# Patient Record
Sex: FEMALE | URBAN - METROPOLITAN AREA
[De-identification: names, ages, dates, MRNs, and addresses within clinical notes are randomized per-mention and may not be internally consistent; named-entity substitution may affect disease eponyms.]

---

## 2023-05-18 PROBLEM — 49436004: Status: ACTIVE | Noted: 2023-05-18

## 2023-05-18 PROBLEM — 711150003: Status: ACTIVE | Noted: 2023-05-18

## 2023-05-19 ENCOUNTER — OFFICE VISIT (OUTPATIENT)
Dept: URBAN - METROPOLITAN AREA CLINIC 113 | Facility: CLINIC | Age: 78
End: 2023-05-19
Payer: MEDICARE

## 2023-05-19 VITALS
HEART RATE: 60 BPM | DIASTOLIC BLOOD PRESSURE: 76 MMHG | SYSTOLIC BLOOD PRESSURE: 118 MMHG | RESPIRATION RATE: 14 BRPM | BODY MASS INDEX: 43.04 KG/M2 | TEMPERATURE: 97.6 F | WEIGHT: 284 LBS | HEIGHT: 68 IN

## 2023-05-19 DIAGNOSIS — Z79.01 CHRONIC ANTICOAGULATION: ICD-10-CM

## 2023-05-19 DIAGNOSIS — R19.5 POSITIVE COLORECTAL CANCER SCREENING USING COLOGUARD TEST: ICD-10-CM

## 2023-05-19 DIAGNOSIS — I48.91 ATRIAL FIBRILLATION, UNSPECIFIED TYPE: ICD-10-CM

## 2023-05-19 PROCEDURE — 99204 OFFICE O/P NEW MOD 45 MIN: CPT | Performed by: INTERNAL MEDICINE

## 2023-05-19 RX ORDER — METFORMIN HYDROCHLORIDE 750 MG/1
1 TABLET WITH EVENING MEAL TABLET, EXTENDED RELEASE ORAL ONCE A DAY
Status: ACTIVE | COMMUNITY

## 2023-05-19 RX ORDER — ATORVASTATIN CALCIUM 80 MG/1
1 TABLET TABLET, FILM COATED ORAL ONCE A DAY
Status: ACTIVE | COMMUNITY

## 2023-05-19 RX ORDER — MONTELUKAST 10 MG/1
1 TABLET TABLET, FILM COATED ORAL ONCE A DAY
Status: ACTIVE | COMMUNITY

## 2023-05-19 RX ORDER — IPRATROPIUM BROMIDE 42 UG/1
2 SPRAYS IN EACH NOSTRIL SPRAY NASAL THREE TIMES A DAY
Status: ACTIVE | COMMUNITY

## 2023-05-19 RX ORDER — APIXABAN 5 MG/1
1 TABLET TABLET, FILM COATED ORAL TWICE A DAY
Status: ACTIVE | COMMUNITY

## 2023-05-19 RX ORDER — LOSARTAN POTASSIUM 100 MG/1
1 TABLET TABLET ORAL ONCE A DAY
Status: ACTIVE | COMMUNITY

## 2023-05-19 RX ORDER — DULOXETINE 60 MG/1
1 CAPSULE CAPSULE, DELAYED RELEASE PELLETS ORAL ONCE A DAY
Status: ACTIVE | COMMUNITY

## 2023-05-19 RX ORDER — LEVOTHYROXINE SODIUM 88 UG/1
1 TABLET IN THE MORNING ON AN EMPTY STOMACH TABLET ORAL ONCE A DAY
Status: ACTIVE | COMMUNITY

## 2023-05-19 NOTE — HPI-TODAY'S VISIT:
78-year-old referred for positive Cologuard test by Dr. Ned Trujillo.  A copy of his report will be sent to his office.  The patient does have a history of atrial fibrillation status post ablation on chronic anticoagulation with Eliquis, diabetes mellitus, coronary artery disease status post coronary artery bypass graft, hypertension, hypothyroidism, sleep apnea, obesity, status post pacemaker placement.  She has heartburn symptoms about twice a week without any dysphagia or abdominal pain.  There is no nausea or vomiting.  She has a bowel movement every day to every other day and has been no blood or melena.  She occasionally will get some chest pains both with exertion and without exertion.  She does have some shortness of breath and dizziness at times.  The last time she saw her cardiologist was 2 months ago. Her cardiologist is Dr. Vitro Dale.  Blood work on 1/16/2023 revealed hemoglobin 12.9, WBC of 4.9 platelet count of 189,000.  Sodium 139 potassium 4.2 BUN 24 creatinine 0.71 AST 30, ALT 20, alk phosphatase 99, total bili 0.6, TSH is 1.93.

## 2023-07-21 ENCOUNTER — OFFICE VISIT (OUTPATIENT)
Dept: URBAN - METROPOLITAN AREA CLINIC 113 | Facility: CLINIC | Age: 78
End: 2023-07-21

## 2024-05-01 ENCOUNTER — OFFICE VISIT (OUTPATIENT)
Dept: URBAN - METROPOLITAN AREA CLINIC 113 | Facility: CLINIC | Age: 79
End: 2024-05-01
Payer: MEDICARE

## 2024-05-01 ENCOUNTER — DASHBOARD ENCOUNTERS (OUTPATIENT)
Age: 79
End: 2024-05-01

## 2024-05-01 ENCOUNTER — LAB OUTSIDE AN ENCOUNTER (OUTPATIENT)
Dept: URBAN - METROPOLITAN AREA CLINIC 113 | Facility: CLINIC | Age: 79
End: 2024-05-01

## 2024-05-01 VITALS
HEIGHT: 68 IN | SYSTOLIC BLOOD PRESSURE: 136 MMHG | HEART RATE: 64 BPM | DIASTOLIC BLOOD PRESSURE: 91 MMHG | BODY MASS INDEX: 43.59 KG/M2 | WEIGHT: 287.6 LBS | TEMPERATURE: 97.3 F

## 2024-05-01 DIAGNOSIS — Z95.818 PRESENCE OF WATCHMAN LEFT ATRIAL APPENDAGE CLOSURE DEVICE: ICD-10-CM

## 2024-05-01 DIAGNOSIS — Z12.11 COLON CANCER SCREENING: ICD-10-CM

## 2024-05-01 DIAGNOSIS — R19.5 POSITIVE COLORECTAL CANCER SCREENING USING COLOGUARD TEST: ICD-10-CM

## 2024-05-01 DIAGNOSIS — Z86.79 HISTORY OF ATRIAL FIBRILLATION: ICD-10-CM

## 2024-05-01 PROBLEM — 441509002: Status: ACTIVE | Noted: 2024-05-01

## 2024-05-01 PROBLEM — 1259226009: Status: ACTIVE | Noted: 2024-05-01

## 2024-05-01 PROCEDURE — 99243 OFF/OP CNSLTJ NEW/EST LOW 30: CPT | Performed by: NURSE PRACTITIONER

## 2024-05-01 PROCEDURE — 99203 OFFICE O/P NEW LOW 30 MIN: CPT | Performed by: NURSE PRACTITIONER

## 2024-05-01 RX ORDER — IPRATROPIUM BROMIDE 42 UG/1
2 SPRAYS IN EACH NOSTRIL SPRAY NASAL THREE TIMES A DAY
Status: ACTIVE | COMMUNITY

## 2024-05-01 RX ORDER — LEVOTHYROXINE SODIUM 88 UG/1
1 TABLET IN THE MORNING ON AN EMPTY STOMACH TABLET ORAL ONCE A DAY
Status: ACTIVE | COMMUNITY

## 2024-05-01 RX ORDER — ASPIRIN 81 MG/1
1 TABLET TABLET, COATED ORAL ONCE A DAY
Status: ACTIVE | COMMUNITY

## 2024-05-01 RX ORDER — ATORVASTATIN CALCIUM 80 MG/1
1 TABLET TABLET, FILM COATED ORAL ONCE A DAY
Status: ACTIVE | COMMUNITY

## 2024-05-01 RX ORDER — MONTELUKAST 10 MG/1
1 TABLET TABLET, FILM COATED ORAL ONCE A DAY
Status: ACTIVE | COMMUNITY

## 2024-05-01 RX ORDER — APIXABAN 5 MG/1
1 TABLET TABLET, FILM COATED ORAL TWICE A DAY
Status: ON HOLD | COMMUNITY

## 2024-05-01 RX ORDER — METFORMIN HYDROCHLORIDE 750 MG/1
1 TABLET WITH EVENING MEAL TABLET, EXTENDED RELEASE ORAL ONCE A DAY
Status: ACTIVE | COMMUNITY

## 2024-05-01 RX ORDER — LOSARTAN POTASSIUM 100 MG/1
1 TABLET TABLET ORAL ONCE A DAY
Status: ACTIVE | COMMUNITY

## 2024-05-01 RX ORDER — DULOXETINE 60 MG/1
1 CAPSULE CAPSULE, DELAYED RELEASE PELLETS ORAL ONCE A DAY
Status: ACTIVE | COMMUNITY

## 2024-07-03 ENCOUNTER — LAB OUTSIDE AN ENCOUNTER (OUTPATIENT)
Dept: URBAN - METROPOLITAN AREA CLINIC 113 | Facility: CLINIC | Age: 79
End: 2024-07-03

## 2024-07-03 ENCOUNTER — TELEPHONE ENCOUNTER (OUTPATIENT)
Dept: URBAN - METROPOLITAN AREA CLINIC 113 | Facility: CLINIC | Age: 79
End: 2024-07-03

## 2024-07-03 RX ORDER — SODIUM, POTASSIUM,MAG SULFATES 17.5-3.13G
AS DIRECTED SOLUTION, RECONSTITUTED, ORAL ORAL ONCE
Qty: 354 ML | Refills: 0 | OUTPATIENT
Start: 2024-07-03 | End: 2024-07-04

## 2024-07-31 ENCOUNTER — OFFICE VISIT (OUTPATIENT)
Dept: URBAN - METROPOLITAN AREA MEDICAL CENTER 19 | Facility: MEDICAL CENTER | Age: 79
End: 2024-07-31

## 2024-08-29 ENCOUNTER — OFFICE VISIT (OUTPATIENT)
Dept: URBAN - METROPOLITAN AREA CLINIC 113 | Facility: CLINIC | Age: 79
End: 2024-08-29
Payer: MEDICARE

## 2024-08-29 VITALS
BODY MASS INDEX: 43.29 KG/M2 | HEIGHT: 68 IN | TEMPERATURE: 97 F | WEIGHT: 285.6 LBS | RESPIRATION RATE: 16 BRPM | HEART RATE: 83 BPM | DIASTOLIC BLOOD PRESSURE: 74 MMHG | SYSTOLIC BLOOD PRESSURE: 111 MMHG

## 2024-08-29 DIAGNOSIS — D12.6 SERRATED ADENOMA OF COLON: ICD-10-CM

## 2024-08-29 PROCEDURE — 99213 OFFICE O/P EST LOW 20 MIN: CPT | Performed by: NURSE PRACTITIONER

## 2024-08-29 RX ORDER — METFORMIN HYDROCHLORIDE 750 MG/1
1 TABLET WITH EVENING MEAL TABLET, EXTENDED RELEASE ORAL ONCE A DAY
Status: ACTIVE | COMMUNITY

## 2024-08-29 RX ORDER — APIXABAN 5 MG/1
1 TABLET TABLET, FILM COATED ORAL TWICE A DAY
Status: ON HOLD | COMMUNITY

## 2024-08-29 RX ORDER — LOSARTAN POTASSIUM 100 MG/1
1 TABLET TABLET ORAL ONCE A DAY
Status: ACTIVE | COMMUNITY

## 2024-08-29 RX ORDER — MONTELUKAST 10 MG/1
1 TABLET TABLET, FILM COATED ORAL ONCE A DAY
Status: ACTIVE | COMMUNITY

## 2024-08-29 RX ORDER — ASPIRIN 81 MG/1
1 TABLET TABLET, COATED ORAL ONCE A DAY
Status: ACTIVE | COMMUNITY

## 2024-08-29 RX ORDER — DULOXETINE 60 MG/1
1 CAPSULE CAPSULE, DELAYED RELEASE PELLETS ORAL ONCE A DAY
Status: ACTIVE | COMMUNITY

## 2024-08-29 RX ORDER — LEVOTHYROXINE SODIUM 88 UG/1
1 TABLET IN THE MORNING ON AN EMPTY STOMACH TABLET ORAL ONCE A DAY
Status: ACTIVE | COMMUNITY

## 2024-08-29 RX ORDER — IPRATROPIUM BROMIDE 42 UG/1
2 SPRAYS IN EACH NOSTRIL SPRAY NASAL THREE TIMES A DAY
Status: ACTIVE | COMMUNITY

## 2024-08-29 RX ORDER — ATORVASTATIN CALCIUM 80 MG/1
1 TABLET TABLET, FILM COATED ORAL ONCE A DAY
Status: ACTIVE | COMMUNITY

## 2024-08-29 NOTE — HPI-TODAY'S VISIT:
80 yo woman presenitng for follow up after a colonoscopy.   Colonoscopy 7/31/24 was notable for a fair bowel preparation, redundant colon, diverticulosis in the entire colon, nonbleeding internal hemorrhoids, removal of a 15 mm polyp from the transverse colon, and three 5 to 7 mm polyps from the transverse colon. Pathology revealed serrated adenoma. A repeat colonoscopy is recommended in 3 years, using additional bowel preparation.  She is doing well from a symptom standpoint. No dysphagia, heartburn or abdominal pain. She has bowel movements every three days, requiring she use an every day Women's laxative.

## 2025-04-21 PROBLEM — 442684004: Status: ACTIVE | Noted: 2025-04-21

## 2025-04-21 PROBLEM — 365767001: Status: ACTIVE | Noted: 2025-04-21

## 2025-04-21 PROBLEM — 428054006: Status: ACTIVE | Noted: 2025-04-21

## 2025-04-21 PROBLEM — 398311004: Status: ACTIVE | Noted: 2025-04-21

## 2025-04-22 ENCOUNTER — OFFICE VISIT (OUTPATIENT)
Dept: URBAN - METROPOLITAN AREA CLINIC 113 | Facility: CLINIC | Age: 80
End: 2025-04-22
Payer: MEDICARE

## 2025-04-22 ENCOUNTER — LAB OUTSIDE AN ENCOUNTER (OUTPATIENT)
Dept: URBAN - METROPOLITAN AREA CLINIC 113 | Facility: CLINIC | Age: 80
End: 2025-04-22

## 2025-04-22 DIAGNOSIS — K80.20 ASYMPTOMATIC CHOLELITHIASIS: ICD-10-CM

## 2025-04-22 DIAGNOSIS — R93.2 ABNORMAL FINDING ON IMAGING OF LIVER: ICD-10-CM

## 2025-04-22 DIAGNOSIS — K57.30 DIVERTICULOSIS OF COLON WITHOUT DIVERTICULITIS: ICD-10-CM

## 2025-04-22 DIAGNOSIS — Z86.0101 H/O ADENOMATOUS POLYP OF COLON: ICD-10-CM

## 2025-04-22 DIAGNOSIS — R74.01 ELEVATED TRANSAMINASE LEVEL: ICD-10-CM

## 2025-04-22 PROBLEM — 266474003: Status: ACTIVE | Noted: 2025-04-22

## 2025-04-22 PROCEDURE — 99214 OFFICE O/P EST MOD 30 MIN: CPT | Performed by: INTERNAL MEDICINE

## 2025-04-22 RX ORDER — ATORVASTATIN CALCIUM 80 MG/1
1 TABLET TABLET, FILM COATED ORAL ONCE A DAY
Status: ACTIVE | COMMUNITY

## 2025-04-22 RX ORDER — ASPIRIN 81 MG/1
1 TABLET TABLET, COATED ORAL ONCE A DAY
Status: ACTIVE | COMMUNITY

## 2025-04-22 RX ORDER — SOTALOL HYDROCHLORIDE 80 MG/1
1 TABLET TABLET ORAL
Status: ACTIVE | COMMUNITY

## 2025-04-22 RX ORDER — DULOXETINE 60 MG/1
1 CAPSULE CAPSULE, DELAYED RELEASE PELLETS ORAL ONCE A DAY
Status: ACTIVE | COMMUNITY

## 2025-04-22 RX ORDER — EZETIMIBE 10 MG/1
1 TABLET TABLET ORAL ONCE A DAY
Status: ACTIVE | COMMUNITY

## 2025-04-22 RX ORDER — METFORMIN HYDROCHLORIDE 750 MG/1
1 TABLET WITH EVENING MEAL TABLET, EXTENDED RELEASE ORAL ONCE A DAY
Status: ACTIVE | COMMUNITY

## 2025-04-22 RX ORDER — MONTELUKAST 10 MG/1
1 TABLET TABLET, FILM COATED ORAL ONCE A DAY
Status: ACTIVE | COMMUNITY

## 2025-04-22 RX ORDER — LOSARTAN POTASSIUM 100 MG/1
1 TABLET TABLET ORAL ONCE A DAY
Status: ACTIVE | COMMUNITY

## 2025-04-22 RX ORDER — IPRATROPIUM BROMIDE 42 UG/1
2 SPRAYS IN EACH NOSTRIL SPRAY NASAL THREE TIMES A DAY
Status: ACTIVE | COMMUNITY

## 2025-04-22 RX ORDER — LEVOTHYROXINE SODIUM 88 UG/1
1 TABLET IN THE MORNING ON AN EMPTY STOMACH TABLET ORAL ONCE A DAY
Status: ACTIVE | COMMUNITY

## 2025-04-22 RX ORDER — APIXABAN 5 MG/1
1 TABLET TABLET, FILM COATED ORAL TWICE A DAY
Status: ON HOLD | COMMUNITY

## 2025-04-22 NOTE — HPI-TODAY'S VISIT:
81 yo woman with a history of diabetes, hypothyroidism, HLD, s/p permanent pacemaker, history of atrial fibrillation s/p ablation on chronic antiplatelet on Clopidogrel status post Watchman on December 2023, initially referred by Dr. Ned Trujillo for positive Cologuard, now referred for elevation in liver enzymes and abnormal ultrasound examination.  There is no family history of liver cirrhosis, liver disease or liver cancer.  She denies any heartburn, abdominal pain, dysphagia.  There is been no nausea or vomiting.  She does move her bowels every day to every other day there is been no blood or melena.  She does not drink alcohol.  She is no longer on any blood thinning medication. She tells me that her pacemaker is permanent and that she is paced 100% of time. She follows with Dr. Carmona at [facility] as well as Dr. Vitor Rowe. She has sleep apnea and uses a BiPap at nighttime, which she has been for years. No supplemental oxygen needs.  She has a history of bariatric surgery in 2005, and has struggled with iron deficiency since this time. She is taking a bariatric vitamin with iron.  Blood work on 2/26/2025 revealed a hemoglobin of 14.5, WBC of 5.6 and platelet count of 139,000.  MCV is 95, iron saturation is 46% with a ferritin of 22.  Sodium 139 potassium 4.6 BUN 25 creatinine 0.89.  AST 45, ALT 58, alkaline phosphatase 104, total bili 0.6, albumin 3.5, TSH is 1.39, A1c is 6.2.

## 2025-04-22 NOTE — HPI-OTHER HISTORIES
Abdominal ultrasound on 4/3/2025 revealed a normal-sized liver there is heterogeneous echotexture with some lobulation of the liver surface concerning for possible cirrhosis.  The common bile duct is of normal size.  The gallbladder wall is normal there is cholelithiasis less than 6 mm in size.  There is a stable 4 cm right renal cyst.  There is no ascites.  Colonoscopy 7/31/24 was notable for a fair bowel preparation, redundant colon, diverticulosis in the entire colon, nonbleeding internal hemorrhoids, removal of a 15 mm polyp from the transverse colon, and three 5 to 7 mm polyps from the transverse colon. Pathology revealed serrated adenoma. A repeat colonoscopy is recommended in 3 years, using additional bowel preparation.

## 2025-04-27 LAB
A/G RATIO: 1.4
ABSOLUTE BASOPHILS: 40
ABSOLUTE EOSINOPHILS: 328
ABSOLUTE LYMPHOCYTES: 2023
ABSOLUTE MONOCYTES: 543
ABSOLUTE NEUTROPHILS: 3765
ACTIN (SMOOTH MUSCLE) ANTIBODY (IGG): <20
ALBUMIN: 3.8
ALKALINE PHOSPHATASE: 99
ALPHA-1-ANTITRYPSIN QN: 154
ALT (SGPT): 83
ANA SCREEN, IFA: NEGATIVE
AST (SGOT): 69
BASOPHILS: 0.6
BILIRUBIN, TOTAL: 0.5
BUN/CREATININE RATIO: 33
BUN: 28
CALCIUM: 9.5
CARBON DIOXIDE, TOTAL: 27
CHLORIDE: 105
CREATININE: 0.84
DONOR, HEPATITIS C ANTIBODY (ANTI-HCV): NONREACTIVE
EGFR: 71
ENHANCED LIVER FIBROSIS (ELF) SCORE: 11.25
EOSINOPHILS: 4.9
GLOBULIN, TOTAL: 2.8
GLUCOSE: 97
HEMATOCRIT: 45.9
HEMOGLOBIN: 14.7
HEPATITIS A AB, TOTAL: (no result)
HEPATITIS B CORE AB TOTAL: REACTIVE
HEPATITIS B SURFACE AB IMMUNITY, QN: 303
HEPATITIS B SURFACE ANTIGEN: (no result)
IMMUNOGLOBULIN A: 335
IMMUNOGLOBULIN G: 1279
IMMUNOGLOBULIN M: 121
INR: 1.1
LKM-1 ANTIBODY (IGG): <=20
LYMPHOCYTES: 30.2
MCH: 31.7
MCHC: 32
MCV: 98.9
MITOCHONDRIAL (M2) ANTIBODY: <=20
MONOCYTES: 8.1
MPV: 11.5
NEUTROPHILS: 56.2
PLATELET COUNT: 142
POTASSIUM: 4.3
PROTEIN, TOTAL: 6.6
PT: 11.5
RDW: 12.2
RED BLOOD CELL COUNT: 4.64
SODIUM: 142
WHITE BLOOD CELL COUNT: 6.7

## 2025-05-02 ENCOUNTER — OFFICE VISIT (OUTPATIENT)
Dept: URBAN - METROPOLITAN AREA CLINIC 112 | Facility: CLINIC | Age: 80
End: 2025-05-02
Payer: MEDICARE

## 2025-05-02 DIAGNOSIS — K76.0 FATTY (CHANGE OF) LIVER: ICD-10-CM

## 2025-05-02 PROCEDURE — 76981 USE PARENCHYMA: CPT | Performed by: INTERNAL MEDICINE

## 2025-05-02 RX ORDER — LEVOTHYROXINE SODIUM 88 UG/1
1 TABLET IN THE MORNING ON AN EMPTY STOMACH TABLET ORAL ONCE A DAY
Status: ACTIVE | COMMUNITY

## 2025-05-02 RX ORDER — ATORVASTATIN CALCIUM 80 MG/1
1 TABLET TABLET, FILM COATED ORAL ONCE A DAY
Status: ACTIVE | COMMUNITY

## 2025-05-02 RX ORDER — EZETIMIBE 10 MG/1
1 TABLET TABLET ORAL ONCE A DAY
Status: ACTIVE | COMMUNITY

## 2025-05-02 RX ORDER — MONTELUKAST 10 MG/1
1 TABLET TABLET, FILM COATED ORAL ONCE A DAY
Status: ACTIVE | COMMUNITY

## 2025-05-02 RX ORDER — METFORMIN HYDROCHLORIDE 750 MG/1
1 TABLET WITH EVENING MEAL TABLET, EXTENDED RELEASE ORAL ONCE A DAY
Status: ACTIVE | COMMUNITY

## 2025-05-02 RX ORDER — LOSARTAN POTASSIUM 100 MG/1
1 TABLET TABLET ORAL ONCE A DAY
Status: ACTIVE | COMMUNITY

## 2025-05-02 RX ORDER — DULOXETINE 60 MG/1
1 CAPSULE CAPSULE, DELAYED RELEASE PELLETS ORAL ONCE A DAY
Status: ACTIVE | COMMUNITY

## 2025-05-02 RX ORDER — IPRATROPIUM BROMIDE 42 UG/1
2 SPRAYS IN EACH NOSTRIL SPRAY NASAL THREE TIMES A DAY
Status: ACTIVE | COMMUNITY

## 2025-05-02 RX ORDER — SOTALOL HYDROCHLORIDE 80 MG/1
1 TABLET TABLET ORAL
Status: ACTIVE | COMMUNITY

## 2025-05-02 RX ORDER — APIXABAN 5 MG/1
1 TABLET TABLET, FILM COATED ORAL TWICE A DAY
Status: ON HOLD | COMMUNITY

## 2025-05-02 RX ORDER — ASPIRIN 81 MG/1
1 TABLET TABLET, COATED ORAL ONCE A DAY
Status: ACTIVE | COMMUNITY

## 2025-05-03 ENCOUNTER — CLAIMS CREATED FROM THE CLAIM WINDOW (OUTPATIENT)
Dept: URBAN - METROPOLITAN AREA CLINIC 117 | Facility: CLINIC | Age: 80
End: 2025-05-03
Payer: MEDICARE

## 2025-05-03 DIAGNOSIS — K76.0 FATTY (CHANGE OF) LIVER: ICD-10-CM

## 2025-05-03 PROCEDURE — 76981 USE PARENCHYMA: CPT | Performed by: INTERNAL MEDICINE

## 2025-06-25 ENCOUNTER — OFFICE VISIT (OUTPATIENT)
Dept: URBAN - METROPOLITAN AREA CLINIC 113 | Facility: CLINIC | Age: 80
End: 2025-06-25
Payer: MEDICARE

## 2025-06-25 ENCOUNTER — LAB OUTSIDE AN ENCOUNTER (OUTPATIENT)
Dept: URBAN - METROPOLITAN AREA CLINIC 113 | Facility: CLINIC | Age: 80
End: 2025-06-25

## 2025-06-25 DIAGNOSIS — D12.6 SERRATED ADENOMA OF COLON: ICD-10-CM

## 2025-06-25 DIAGNOSIS — K80.20 ASYMPTOMATIC CHOLELITHIASIS: ICD-10-CM

## 2025-06-25 DIAGNOSIS — R74.01 ELEVATED TRANSAMINASE LEVEL: ICD-10-CM

## 2025-06-25 DIAGNOSIS — K57.30 DIVERTICULOSIS OF COLON WITHOUT DIVERTICULITIS: ICD-10-CM

## 2025-06-25 DIAGNOSIS — K74.02 ADVANCED HEPATIC FIBROSIS: ICD-10-CM

## 2025-06-25 DIAGNOSIS — R93.2 ABNORMAL FINDING ON IMAGING OF LIVER: ICD-10-CM

## 2025-06-25 PROBLEM — 62484002: Status: ACTIVE | Noted: 2025-06-25

## 2025-06-25 PROCEDURE — 99214 OFFICE O/P EST MOD 30 MIN: CPT | Performed by: INTERNAL MEDICINE

## 2025-06-25 RX ORDER — APIXABAN 5 MG/1
1 TABLET TABLET, FILM COATED ORAL TWICE A DAY
Status: ON HOLD | COMMUNITY

## 2025-06-25 RX ORDER — DULOXETINE 60 MG/1
1 CAPSULE CAPSULE, DELAYED RELEASE PELLETS ORAL ONCE A DAY
Status: ACTIVE | COMMUNITY

## 2025-06-25 RX ORDER — IPRATROPIUM BROMIDE 42 UG/1
2 SPRAYS IN EACH NOSTRIL SPRAY NASAL THREE TIMES A DAY
Status: ACTIVE | COMMUNITY

## 2025-06-25 RX ORDER — ATORVASTATIN CALCIUM 80 MG/1
1 TABLET TABLET, FILM COATED ORAL ONCE A DAY
Status: ACTIVE | COMMUNITY

## 2025-06-25 RX ORDER — MONTELUKAST 10 MG/1
1 TABLET TABLET, FILM COATED ORAL ONCE A DAY
Status: ACTIVE | COMMUNITY

## 2025-06-25 RX ORDER — EZETIMIBE 10 MG/1
1 TABLET TABLET ORAL ONCE A DAY
Status: ACTIVE | COMMUNITY

## 2025-06-25 RX ORDER — LOSARTAN POTASSIUM 100 MG/1
1 TABLET TABLET ORAL ONCE A DAY
Status: ACTIVE | COMMUNITY

## 2025-06-25 RX ORDER — ASPIRIN 81 MG/1
1 TABLET TABLET, COATED ORAL ONCE A DAY
Status: ACTIVE | COMMUNITY

## 2025-06-25 RX ORDER — METFORMIN HYDROCHLORIDE 750 MG/1
1 TABLET WITH EVENING MEAL TABLET, EXTENDED RELEASE ORAL ONCE A DAY
Status: ACTIVE | COMMUNITY

## 2025-06-25 RX ORDER — SOTALOL HYDROCHLORIDE 80 MG/1
1 TABLET TABLET ORAL
Status: ACTIVE | COMMUNITY

## 2025-06-25 RX ORDER — LEVOTHYROXINE SODIUM 88 UG/1
1 TABLET IN THE MORNING ON AN EMPTY STOMACH TABLET ORAL ONCE A DAY
Status: ACTIVE | COMMUNITY

## 2025-06-25 NOTE — HPI-TODAY'S VISIT:
81 yo woman with a history of diabetes, hypothyroidism, HLD, s/p permanent pacemaker, history of atrial fibrillation s/p ablation on chronic antiplatelet on Clopidogrel status post Watchman on December 2023, initially referred by Dr. Ned Trujillo for positive Cologuard, now referred for elevation in liver enzymes and abnormal ultrasound examination.  She is no longer on any blood thinning medication. She tells me that her pacemaker is permanent and that she is paced 100% of time. She follows with Dr. Carmona at [facility] as well as Dr. Vitor Rowe. She has sleep apnea and uses a BiPap at nighttime, which she has been for years. No supplemental oxygen needs.  She has a history of bariatric surgery in 2005, and has struggled with iron deficiency since this time. She is taking a bariatric vitamin with iron.  Overall she is doing well.  She denies any dysphagia, heartburn or abdominal pain.  There is no nausea or vomiting.  She moves her bowels every day usually sometimes every other day.  There is been no blood or melena.  She does not drink alcohol.  There is no family history of liver cirrhosis liver disease or liver cancer.  Blood work on 4/22/2025 revealed a hemoglobin of 14.7, WBC of 6.7 and platelet count of 142,000.  Sodium 142 potassium 4.3 BUN 28 creatinine 0.84.  AST 69, ALT 83, alkaline phosphatase 99, total bili 0.5, albumin 3.8.  PT/INR is 1.1.  Enhanced liver fibrosis score is 11.25 which suggest increased risk of progression to liver cirrhosis advanced fibrosis.  Blood work on 2/26/2025 revealed a hemoglobin of 14.5, WBC of 5.6 and platelet count of 139,000.  MCV is 95, iron saturation is 46% with a ferritin of 22.  Sodium 139 potassium 4.6 BUN 25 creatinine 0.89.  AST 45, ALT 58, alkaline phosphatase 104, total bili 0.6, albumin 3.5, TSH is 1.39, A1c is 6.2.

## 2025-06-25 NOTE — HPI-OTHER HISTORIES
FibroScan on 5/2/2025 reveals steatosis grade of S2 and Fibrosis score of F0 suggesting no fibrosis.  Blood work on 4/22/2025 revealed IgA is 335, IgG and IgM normal.  Anti-smooth muscle antibody negative, antinuclear antibody negative, antimitochondrial antibody negative, anti-L KM negative.  Alpha 1 antitrypsin level is 154 normal.  Hepatitis A total antibody negative, hepatitis B surface antigen negative, hepatitis B surface antibody positive, hepatitis B core antibody total positive, hepatitis C antibody negative.  Abdominal ultrasound on 4/3/2025 revealed a normal-sized liver there is heterogeneous echotexture with some lobulation of the liver surface concerning for possible cirrhosis.  The common bile duct is of normal size.  The gallbladder wall is normal there is cholelithiasis less than 6 mm in size.  There is a stable 4 cm right renal cyst.  There is no ascites.  Colonoscopy 7/31/24 was notable for a fair bowel preparation, redundant colon, diverticulosis in the entire colon, nonbleeding internal hemorrhoids, removal of a 15 mm polyp from the transverse colon, and three 5 to 7 mm polyps from the transverse colon. Pathology revealed serrated adenoma. A repeat colonoscopy is recommended in 3 years, using additional bowel preparation.